# Patient Record
Sex: MALE | Race: WHITE | Employment: OTHER | URBAN - METROPOLITAN AREA
[De-identification: names, ages, dates, MRNs, and addresses within clinical notes are randomized per-mention and may not be internally consistent; named-entity substitution may affect disease eponyms.]

---

## 2017-01-01 ENCOUNTER — APPOINTMENT (OUTPATIENT)
Dept: CT IMAGING | Facility: CLINIC | Age: 67
DRG: 070 | End: 2017-01-01
Attending: EMERGENCY MEDICINE
Payer: MEDICARE

## 2017-01-01 ENCOUNTER — APPOINTMENT (OUTPATIENT)
Dept: MRI IMAGING | Facility: CLINIC | Age: 67
DRG: 070 | End: 2017-01-01
Payer: MEDICARE

## 2017-01-01 ENCOUNTER — APPOINTMENT (OUTPATIENT)
Dept: GENERAL RADIOLOGY | Facility: CLINIC | Age: 67
DRG: 070 | End: 2017-01-01
Attending: EMERGENCY MEDICINE
Payer: MEDICARE

## 2017-01-01 ENCOUNTER — HOSPITAL ENCOUNTER (INPATIENT)
Facility: CLINIC | Age: 67
LOS: 1 days | DRG: 070 | End: 2017-01-09
Attending: EMERGENCY MEDICINE | Admitting: INTERNAL MEDICINE
Payer: MEDICARE

## 2017-01-01 VITALS
WEIGHT: 192.9 LBS | SYSTOLIC BLOOD PRESSURE: 111 MMHG | TEMPERATURE: 98.3 F | RESPIRATION RATE: 25 BRPM | BODY MASS INDEX: 27.01 KG/M2 | DIASTOLIC BLOOD PRESSURE: 51 MMHG | OXYGEN SATURATION: 89 % | HEIGHT: 71 IN

## 2017-01-01 DIAGNOSIS — R41.82 ALTERED MENTAL STATUS, UNSPECIFIED ALTERED MENTAL STATUS TYPE: ICD-10-CM

## 2017-01-01 DIAGNOSIS — D64.9 ANEMIA, UNSPECIFIED TYPE: ICD-10-CM

## 2017-01-01 DIAGNOSIS — Z99.2 ESRD (END STAGE RENAL DISEASE) ON DIALYSIS (H): ICD-10-CM

## 2017-01-01 DIAGNOSIS — Z66 DNR (DO NOT RESUSCITATE): ICD-10-CM

## 2017-01-01 DIAGNOSIS — N18.6 ESRD (END STAGE RENAL DISEASE) ON DIALYSIS (H): ICD-10-CM

## 2017-01-01 DIAGNOSIS — Z78.9 DNI (DO NOT INTUBATE): ICD-10-CM

## 2017-01-01 LAB
ABO + RH BLD: NORMAL
ABO + RH BLD: NORMAL
ALBUMIN SERPL-MCNC: 2.7 G/DL (ref 3.4–5)
ALP SERPL-CCNC: 103 U/L (ref 40–150)
ALT SERPL W P-5'-P-CCNC: 10 U/L (ref 0–70)
AMMONIA PLAS-SCNC: NORMAL UMOL/L (ref 10–50)
ANION GAP SERPL CALCULATED.3IONS-SCNC: 14 MMOL/L (ref 3–14)
APTT PPP: 64 SEC (ref 22–37)
AST SERPL W P-5'-P-CCNC: 8 U/L (ref 0–45)
BASOPHILS # BLD AUTO: 0 10E9/L (ref 0–0.2)
BASOPHILS NFR BLD AUTO: 0.3 %
BILIRUB SERPL-MCNC: 0.5 MG/DL (ref 0.2–1.3)
BLD GP AB SCN SERPL QL: NORMAL
BLOOD BANK CMNT PATIENT-IMP: NORMAL
BUN SERPL-MCNC: 76 MG/DL (ref 7–30)
CA-I BLD-SCNC: 5.1 MG/DL (ref 4.4–5.2)
CALCIUM SERPL-MCNC: 9.4 MG/DL (ref 8.5–10.1)
CHLORIDE SERPL-SCNC: 105 MMOL/L (ref 94–109)
CO2 BLDCOV-SCNC: 16 MMOL/L (ref 21–28)
CO2 BLDCOV-SCNC: 17 MMOL/L (ref 21–28)
CO2 SERPL-SCNC: 19 MMOL/L (ref 20–32)
CREAT SERPL-MCNC: 5 MG/DL (ref 0.66–1.25)
DIFFERENTIAL METHOD BLD: ABNORMAL
EOSINOPHIL # BLD AUTO: 0.1 10E9/L (ref 0–0.7)
EOSINOPHIL NFR BLD AUTO: 0.9 %
ERYTHROCYTE [DISTWIDTH] IN BLOOD BY AUTOMATED COUNT: 17.8 % (ref 10–15)
ERYTHROCYTE [DISTWIDTH] IN BLOOD BY AUTOMATED COUNT: 18 % (ref 10–15)
GFR SERPL CREATININE-BSD FRML MDRD: 12 ML/MIN/1.7M2
GLUCOSE BLD-MCNC: 102 MG/DL (ref 70–99)
GLUCOSE BLDC GLUCOMTR-MCNC: 103 MG/DL (ref 70–99)
GLUCOSE SERPL-MCNC: 107 MG/DL (ref 70–99)
HCT VFR BLD AUTO: 18.3 % (ref 40–53)
HCT VFR BLD AUTO: 18.4 % (ref 40–53)
HCT VFR BLD CALC: 28 %PCV (ref 40–53)
HGB BLD CALC-MCNC: 9.5 G/DL (ref 13.3–17.7)
HGB BLD-MCNC: 5.8 G/DL (ref 13.3–17.7)
HGB BLD-MCNC: 6 G/DL (ref 13.3–17.7)
IMM GRANULOCYTES # BLD: 0 10E9/L (ref 0–0.4)
IMM GRANULOCYTES NFR BLD: 0.3 %
INR BLD: 5.7 (ref 0.86–1.14)
INR PPP: 5.25 (ref 0.86–1.14)
INTERPRETATION ECG - MUSE: NORMAL
LACTATE BLD-SCNC: 1.7 MMOL/L (ref 0.7–2.1)
LYMPHOCYTES # BLD AUTO: 0.7 10E9/L (ref 0.8–5.3)
LYMPHOCYTES NFR BLD AUTO: 9.7 %
MCH RBC QN AUTO: 33.1 PG (ref 26.5–33)
MCH RBC QN AUTO: 33.3 PG (ref 26.5–33)
MCHC RBC AUTO-ENTMCNC: 31.7 G/DL (ref 31.5–36.5)
MCHC RBC AUTO-ENTMCNC: 32.6 G/DL (ref 31.5–36.5)
MCV RBC AUTO: 102 FL (ref 78–100)
MCV RBC AUTO: 105 FL (ref 78–100)
MONOCYTES # BLD AUTO: 0.8 10E9/L (ref 0–1.3)
MONOCYTES NFR BLD AUTO: 11.4 %
MRSA DNA SPEC QL NAA+PROBE: NORMAL
NEUTROPHILS # BLD AUTO: 5.7 10E9/L (ref 1.6–8.3)
NEUTROPHILS NFR BLD AUTO: 77.4 %
NRBC # BLD AUTO: 0 10*3/UL
NRBC BLD AUTO-RTO: 0 /100
PCO2 BLDV: 33 MM HG (ref 40–50)
PCO2 BLDV: 33 MM HG (ref 40–50)
PH BLDV: 7.3 PH (ref 7.32–7.43)
PH BLDV: 7.31 PH (ref 7.32–7.43)
PLATELET # BLD AUTO: 103 10E9/L (ref 150–450)
PLATELET # BLD AUTO: 142 10E9/L (ref 150–450)
PO2 BLDV: 44 MM HG (ref 25–47)
PO2 BLDV: 52 MM HG (ref 25–47)
POTASSIUM BLD-SCNC: 4.4 MMOL/L (ref 3.4–5.3)
POTASSIUM SERPL-SCNC: 4.4 MMOL/L (ref 3.4–5.3)
PROT SERPL-MCNC: 7 G/DL (ref 6.8–8.8)
RBC # BLD AUTO: 1.75 10E12/L (ref 4.4–5.9)
RBC # BLD AUTO: 1.8 10E12/L (ref 4.4–5.9)
RETICS # AUTO: 81 10E9/L (ref 25–95)
RETICS/RBC NFR AUTO: 4.5 % (ref 0.5–2)
SAO2 % BLDV FROM PO2: 76 %
SAO2 % BLDV FROM PO2: 83 %
SODIUM BLD-SCNC: 138 MMOL/L (ref 133–144)
SODIUM SERPL-SCNC: 138 MMOL/L (ref 133–144)
SPECIMEN EXP DATE BLD: NORMAL
SPECIMEN SOURCE: NORMAL
WBC # BLD AUTO: 14.2 10E9/L (ref 4–11)
WBC # BLD AUTO: 7.4 10E9/L (ref 4–11)

## 2017-01-01 PROCEDURE — 40000498 ZZHCL STATISTIC POTASSIUM ED POCT

## 2017-01-01 PROCEDURE — 36415 COLL VENOUS BLD VENIPUNCTURE: CPT | Performed by: EMERGENCY MEDICINE

## 2017-01-01 PROCEDURE — 82330 ASSAY OF CALCIUM: CPT

## 2017-01-01 PROCEDURE — 40000501 ZZHCL STATISTIC HEMATOCRIT ED POCT

## 2017-01-01 PROCEDURE — 87641 MR-STAPH DNA AMP PROBE: CPT | Performed by: INTERNAL MEDICINE

## 2017-01-01 PROCEDURE — 85027 COMPLETE CBC AUTOMATED: CPT | Performed by: INTERNAL MEDICINE

## 2017-01-01 PROCEDURE — 85610 PROTHROMBIN TIME: CPT | Mod: QW

## 2017-01-01 PROCEDURE — 82140 ASSAY OF AMMONIA: CPT | Performed by: EMERGENCY MEDICINE

## 2017-01-01 PROCEDURE — 93005 ELECTROCARDIOGRAM TRACING: CPT

## 2017-01-01 PROCEDURE — 86900 BLOOD TYPING SEROLOGIC ABO: CPT | Performed by: EMERGENCY MEDICINE

## 2017-01-01 PROCEDURE — 86901 BLOOD TYPING SEROLOGIC RH(D): CPT | Performed by: EMERGENCY MEDICINE

## 2017-01-01 PROCEDURE — 70498 CT ANGIOGRAPHY NECK: CPT

## 2017-01-01 PROCEDURE — 25000125 ZZHC RX 250: Performed by: EMERGENCY MEDICINE

## 2017-01-01 PROCEDURE — 25000128 H RX IP 250 OP 636: Performed by: EMERGENCY MEDICINE

## 2017-01-01 PROCEDURE — 85610 PROTHROMBIN TIME: CPT | Performed by: EMERGENCY MEDICINE

## 2017-01-01 PROCEDURE — 74000 XR ABDOMEN PORT F1 VW: CPT

## 2017-01-01 PROCEDURE — 85025 COMPLETE CBC W/AUTO DIFF WBC: CPT | Performed by: EMERGENCY MEDICINE

## 2017-01-01 PROCEDURE — 20000004 ZZH R&B ICU UMMC

## 2017-01-01 PROCEDURE — 85730 THROMBOPLASTIN TIME PARTIAL: CPT | Performed by: EMERGENCY MEDICINE

## 2017-01-01 PROCEDURE — 87640 STAPH A DNA AMP PROBE: CPT | Performed by: INTERNAL MEDICINE

## 2017-01-01 PROCEDURE — 99291 CRITICAL CARE FIRST HOUR: CPT | Mod: 25 | Performed by: EMERGENCY MEDICINE

## 2017-01-01 PROCEDURE — 40000497 ZZHCL STATISTIC SODIUM ED POCT

## 2017-01-01 PROCEDURE — 96374 THER/PROPH/DIAG INJ IV PUSH: CPT

## 2017-01-01 PROCEDURE — 40000556 ZZH STATISTIC PERIPHERAL IV START W US GUIDANCE

## 2017-01-01 PROCEDURE — 25500064 ZZH RX 255 OP 636: Performed by: EMERGENCY MEDICINE

## 2017-01-01 PROCEDURE — 99285 EMERGENCY DEPT VISIT HI MDM: CPT | Mod: 25

## 2017-01-01 PROCEDURE — 40000739 ZZH STATISTIC STROKE CODE W/O ACCESS

## 2017-01-01 PROCEDURE — 87040 BLOOD CULTURE FOR BACTERIA: CPT | Performed by: EMERGENCY MEDICINE

## 2017-01-01 PROCEDURE — 80053 COMPREHEN METABOLIC PANEL: CPT | Performed by: EMERGENCY MEDICINE

## 2017-01-01 PROCEDURE — 83605 ASSAY OF LACTIC ACID: CPT

## 2017-01-01 PROCEDURE — 86850 RBC ANTIBODY SCREEN: CPT | Performed by: EMERGENCY MEDICINE

## 2017-01-01 PROCEDURE — 70551 MRI BRAIN STEM W/O DYE: CPT

## 2017-01-01 PROCEDURE — 40000502 ZZHCL STATISTIC GLUCOSE ED POCT

## 2017-01-01 PROCEDURE — 93010 ELECTROCARDIOGRAM REPORT: CPT | Mod: Z6 | Performed by: EMERGENCY MEDICINE

## 2017-01-01 PROCEDURE — 85045 AUTOMATED RETICULOCYTE COUNT: CPT | Performed by: EMERGENCY MEDICINE

## 2017-01-01 PROCEDURE — 84145 PROCALCITONIN (PCT): CPT | Performed by: INTERNAL MEDICINE

## 2017-01-01 PROCEDURE — 25000125 ZZHC RX 250: Performed by: INTERNAL MEDICINE

## 2017-01-01 PROCEDURE — 71010 XR CHEST PORT 1 VW: CPT

## 2017-01-01 PROCEDURE — 00000146 ZZHCL STATISTIC GLUCOSE BY METER IP

## 2017-01-01 PROCEDURE — 82803 BLOOD GASES ANY COMBINATION: CPT

## 2017-01-01 RX ORDER — PIPERACILLIN SODIUM, TAZOBACTAM SODIUM 2; .25 G/10ML; G/10ML
2.25 INJECTION, POWDER, LYOPHILIZED, FOR SOLUTION INTRAVENOUS EVERY 6 HOURS
Status: DISCONTINUED | OUTPATIENT
Start: 2017-01-01 | End: 2017-01-01 | Stop reason: HOSPADM

## 2017-01-01 RX ORDER — ALBUTEROL SULFATE 0.83 MG/ML
2.5 SOLUTION RESPIRATORY (INHALATION)
Status: DISCONTINUED | OUTPATIENT
Start: 2017-01-01 | End: 2017-01-01 | Stop reason: CLARIF

## 2017-01-01 RX ORDER — IPRATROPIUM BROMIDE AND ALBUTEROL SULFATE 2.5; .5 MG/3ML; MG/3ML
3 SOLUTION RESPIRATORY (INHALATION) EVERY 6 HOURS
Status: DISCONTINUED | OUTPATIENT
Start: 2017-01-01 | End: 2017-01-01 | Stop reason: HOSPADM

## 2017-01-01 RX ORDER — SODIUM CHLORIDE 9 MG/ML
INJECTION, SOLUTION INTRAVENOUS CONTINUOUS
Status: DISCONTINUED | OUTPATIENT
Start: 2017-01-01 | End: 2017-01-01

## 2017-01-01 RX ORDER — IOPAMIDOL 755 MG/ML
75 INJECTION, SOLUTION INTRAVASCULAR ONCE
Status: COMPLETED | OUTPATIENT
Start: 2017-01-01 | End: 2017-01-01

## 2017-01-01 RX ORDER — NALOXONE HYDROCHLORIDE 0.4 MG/ML
.1-.4 INJECTION, SOLUTION INTRAMUSCULAR; INTRAVENOUS; SUBCUTANEOUS
Status: DISCONTINUED | OUTPATIENT
Start: 2017-01-01 | End: 2017-01-01 | Stop reason: HOSPADM

## 2017-01-01 RX ORDER — MORPHINE SULFATE 2 MG/ML
INJECTION, SOLUTION INTRAMUSCULAR; INTRAVENOUS
Status: DISCONTINUED
Start: 2017-01-01 | End: 2017-01-01 | Stop reason: WASHOUT

## 2017-01-01 RX ADMIN — SODIUM CHLORIDE: 9 INJECTION, SOLUTION INTRAVENOUS at 10:12

## 2017-01-01 RX ADMIN — PANTOPRAZOLE SODIUM 40 MG: 40 INJECTION, POWDER, FOR SOLUTION INTRAVENOUS at 12:32

## 2017-01-01 RX ADMIN — PIPERACILLIN SODIUM,TAZOBACTAM SODIUM 2.25 G: 2; .25 INJECTION, POWDER, FOR SOLUTION INTRAVENOUS at 15:47

## 2017-01-01 RX ADMIN — IOPAMIDOL 75 ML: 755 INJECTION, SOLUTION INTRAVENOUS at 09:08

## 2017-01-01 RX ADMIN — SODIUM CHLORIDE, PRESERVATIVE FREE 90 ML: 5 INJECTION INTRAVENOUS at 09:08

## 2017-01-09 PROBLEM — R41.82 ALTERED MENTAL STATUS: Status: ACTIVE | Noted: 2017-01-01

## 2017-01-09 NOTE — PHARMACY
Pharmacy Stroke Code Response  Pharmacist responded as part of the Stroke Code Team activation to patient care area ED.  The Stroke Team determined that the patient was not a candidate for IV alteplase therapy and the pharmacy team was dismissed at 0840.

## 2017-01-09 NOTE — SIGNIFICANT EVENT
DEATH PRONOUNCEMENT     Physical Exam: Patient was unresponsive to noxious stimuli, no spontaneous respirations. Breath and heart sounds absent upon auscultation, no pupillary light reflex.     Patient was pronounced dead at:  5:15 pm   Autopsy was discussed with the family and family consented.     Vy An PGY-3  Internal Medicine

## 2017-01-09 NOTE — H&P
"INTERNAL MEDICINE HISTORY & PHYSICAL  MEDICAL ICU  Date of service: 2017      Patient: Laurent Chen      : 1950      MRN: [id number]    PCP: Maximiliano Jones Md          Chief complaint:   Right eye deviation           History of Present Illness:   Laurent Chen is a 66 year old with PMH of COPD, ISAURA, HTN, CAD s/p PCI, CVA, afib (on coumadin),  TAVR (2014),  ESRD secondary to FSGS with failed LDKT on in center dialysis, admitted from dialysis with AMS and concern for stroke.  Per collateral history patient had been at his LTACH and has been \"normal\" neurologically except he had some cough and \"congestion\". He then went for dialysis today where dialysis staff noted he had \"eye deviation\" and altered mental status. They sent him to [facility] ED. In [facility] ED stroke code called. Stroke Neuro team does not think this is CVA but do think he should have neurology consult for possible seizure rule out.   History is all obtained from patient LTACH. Attempted to contact patient wife several times but cell # listed the person answered that they were not named [place]. The work number was not functioning. [facility]          Review of Symptoms:     Patient minimally responsive. Attempted to contact wife multiple times on demographics section regarding patient. [facility].   They said at baseline he is \"cognitively aware\". They said he can answer \"any questions\". He came down with \"cough, congestion\", no fevers or chills. Today they got him up for dialysis, he had difficult time as he was coughing and had bowel movement with coughing.   They also stated patient did not have any hematochezia, hematuria, hemoptysis, melena.   Current POLST 11/15/16 DNR/DNI per [facility]           Past Medical History:     Past Medical History   Diagnosis Date     Aortic stenosis      SOB (shortness of breath)      COPD (chronic obstructive pulmonary disease) (H)      Pneumonia " 1/31/14-2/8/14     Volume overload      Dialysis patient (H)      Sleep apnea      HTN (hypertension)      ASCVD (arteriosclerotic cardiovascular disease)      Anemia      Thrombocytopenia (H)      A-V fistula (H)      Tobacco use      Atrial fibrillation (H)      Gout      CAD (coronary artery disease)      CVA (cerebral vascular accident) (H)      no residual     Stented coronary artery      Antiplatelet or antithrombotic long-term use      Walking troubles      Difficulty in walking(719.7)      Oxygen dependent      4 liters     Heart murmur      Renal failure      Dialysis M-W-F     Gastro-oesophageal reflux disease        Past Surgical History   Procedure Laterality Date     Av fistula or graft arterial       Transplant kidney recipient living related       kidney from daughter     Cataract iol, rt/lt       Cardiac surgery       plasty with stent     Heart cath femoral cannulization with open standby repair aortic valve  7/23/2014     Procedure: HEART CATH FEMORAL CANNULIZATION WITH OPEN STANDBY REPAIR AORTIC VALVE;  Surgeon: Chu Rao MD;  Location:  OR              Allergies:     No Known Allergies            Medications:       No current facility-administered medications on file prior to encounter.  Current Outpatient Prescriptions on File Prior to Encounter:  omeprazole (PRILOSEC OTC) 20 MG tablet Take 1 tablet (20 mg) by mouth daily   ticagrelor (BRILINTA) 90 MG tablet Take 1 tablet (90 mg) by mouth every 12 hours   calcium acetate (PHOSLO) 667 MG CAPS Take 667 mg by mouth 3 times daily (with meals) And with snack   atorvastatin (LIPITOR) 40 MG tablet Take 1 tablet (40 mg) by mouth daily   ALLOPURINOL PO Take 100 mg by mouth daily   nitroglycerin (NITROSTAT) 0.4 MG SL tablet Place 1 tablet (0.4 mg) under the tongue every 5 minutes as needed for chest pain   triamcinolone (KENALOG) 0.1 % cream Apply topically as needed   ammonium lactate (AMLACTIN) 12 % cream Apply topically as needed   WARFARIN  SODIUM PO Take 5-7.5 mg by mouth daily . Take 5mg once daily every Thursday, Saturday, and Sunday. Take 7.5mg once daily every Monday, Tuesday, Wednesday, and Friday.   ACETAMINOPHEN PO Take 500 mg by mouth at onset of headache As needed   albuterol (PROAIR HFA, PROVENTIL HFA, VENTOLIN HFA) 108 (90 BASE) MCG/ACT inhaler Inhale 2 puffs into the lungs every 4 hours as needed   VITAMIN D, CHOLECALCIFEROL, PO Take 1,000 Units by mouth daily   ipratropium - albuterol 0.5 mg/2.5 mg/3 mL (DUONEB) 0.5-2.5 (3) MG/3ML nebulization Take 1 vial by nebulization every 4 hours as needed   metoprolol (LOPRESSOR) 100 MG tablet Take 100 mg by mouth 2 times daily Take 100 mg bid on non-dialysis days. 100 mg once a day on dialysis days.   furosemide (LASIX) 80 MG tablet Take 80 mg by mouth 2 times daily   Calcium-Vitamin D (CALTRATE 600 PLUS-VIT D OR) Take by mouth 2 times daily            Social &  Family History:   Lives in LTACH          Physical Exam:   /82 mmHg  Resp 34  SpO2 99%    Resp: 34    General:  alert,   HEENT: NC/AT, dry mouth   CV: irregularly regular, no m/r/g  Lungs: tachypnic but no wheezes  Abd: Soft, ND, NT, active BS,  Ext: pigmented extremities   Skin: No rashes, cyanosis, or jaundice  Neuro: alerted but not oriented or            Data:   Labs: All labs reviewed in EMR. Pertinent include:  Cr 5   Lactate 1.7  7.3/33/52/16 (VBG)  CBC  hgb 6.0 (baseline 8-9)   Plt 103     INR 5.25     EKG:     Bld cx x 2     Imaging (all imaging studies reviewed by me):  # CXR   Aortic valve repair device in place. The cardiomediastinal silhouette  is enlarged, stable. Atherosclerotic calcifications of the aortic  arch. Trachea is midline. The pulmonary vasculature is indistinct.  Bilateral pleural effusions, left greater than right. No appreciable  pneumothorax. Diffuse bilateral mixed interstitial and airspace  opacities, greatest in the lung bases. No acute osseous  abnormality.                                                                       Impression:  Mixed interstitial and airspace opacities, greatest in  the lung bases, with associated pleural effusions with overlying  atelectasis/consolidation and pulmonary congestion is most consistent  with pulmonary edema; however, infection cannot be excluded.     I have personally reviewed the examination and initial interpretation  and I agree with the findings      #MRI:   Impression:  1. No acute infarct.  2. Mild chronic small vessel ischemic disease and generalized  parenchymal volume loss.     I have personally reviewed the examination and initial interpretation  and I agree with the findings.    Procedures: Care Everywhere  12/2016  The examined esophagus was normal.         The Z-line was regular and was found 47 cm from the incisors.         Patchy mildly erythematous and edematous mucosa was found in the gastric          antrum. Biopsies were taken with a cold forceps for histology. Mild          mucosal oozing noted from one spot in antrum. No ulcer or erosion noted          at this site. Area was successfully injected with of a 1:10,000 solution          of epinephrine for hemostasis. For hemostasis, one hemostatic clip was          successfully placed. There was no bleeding at the end of the procedure.         The exam of the stomach was otherwise normal including retroflexed views          of the cardia and fundus.         The duodenal bulb and 2nd part of the duodenum were normal.    Impression:         - Normal esophagus.         - Z-line regular, 47 cm from the incisors.         - Erythematous edematous mucosa in the antrum with mild oozing from one          spot. Injected. Clip was placed.         - Normal duodenal bulb and 2nd part of the duodenum.  Hgb 6.9 at that time   Colonoscopy:   3/2016   Impression: - Internal hemorrhoids.  - The entire examined colon is normal.  - The examined portion of the ileum  was normal.  - One 5 mm polyp in the rectum. Biopsied.  - Biopsies performed in the terminal ileum, each in the   cecum/ascending/hepatic flexure, each in the   proximal/distal transverse colon/splenic flexure and   each in the descending/sigmoid/rectum.            Assessment/Plan:   Laurent Chen is a 66 year old with PMH of COPD, ISAURA, HTN, CAD s/p PCI, CVA, afib (on coumadin),  TAVR (7/2014),  ESRD secondary to FSGS with failed LDKT on in center dialysis, admitted from dialysis with AMS and concern for stroke.    NEURO:  # Analgesia/Sedation: None    # Encephalopathy: CVA ruled out per stroke team. Etiology is unclear. Has abnormal neurological findings on exam currently but seems to be improving from sign out given by ED. Ddx is broad but not limited to toxic, metabolic, etc.   --General Neurology consult  --NPO    RESP:  # Acute hypoxic respiratory failure: Some evidence of infiltrate on CXR and has increased BUN/cr and possibly new oxygen requirements? 2/2 infection vs volume overload.    --Sputum culture   --Blood culture x 2   --Zosyn, vanc and levofloxacin   --Procalcitonin   --Supplemental oxygen as needed     # COPD: duoneb, albuterol     CV:   # AS s/p TAVR    # CAD s/p PCI in 2014   Ticagrelor, ASA and Warfarin  Hold warfarin for now  Continue Brilinta   Atorvastatin      # A fib on warfarin   Metoprolol,  Hold for now with concern for sepsis and hypotension.     ID:   Cultures: Blood cultures pending   Per history, imaging possibly pulmonary source  Pro calcitonin pending   Zosyn, vanc and levofloxacin         RENAL:  ESRD: Renal consulted     # Fluids: hypervolemic, Renal consulted for dialysis     # Electrolytes: Stable     GI:   # H/o GIB (unclear source): Unlcear history as the [facility] states patient has no blood discharge (hematoheczia, hematuria, hemopytysis) however hgb is 6.0. Extensive workup in past year per OSH records. Hgb has been 6.9 as most recently as December.    --NPO  --PPI  --Consider GI consult after speaking with wife and do rectal exam.    ENDO:  No issues   Hypoglycemia protocol when NPO    HEME/ONC:  # Anemia: likely 2/2 GIB v microcytic anemia from longstanding blood loss and CKD    --Transfuse when consent able to be obtained from wife. Currently patient is alert but not oriented and HD stable. Thus no emergent indication for transfusion.       Ppx:  # Ulcer: PPI  # VTE: None, given anemia and INR elevated      Lines/Devices:  PIV    Code: DNR/DNI per most recent POLST.     Dispo: MICU for HD and possible CAP and ams of unclear etiology     [place] (wife) [phone] and [phone]  [facility] [phone]    Plan of care was discussed w/ Dr. Vee M.D. Please, see attending addendum for changes & additions to the plan.    Vy An   Internal Medicine

## 2017-01-09 NOTE — CONSULTS
Brief Neurology Consult Note    Neurology consulted for encephalopathy of unclear etiology. On arrival to the patients room the patient is agonal breathing, eyes open with eyes rolled into back of head. No significant gaze deviation. Pupils equal and reactive. Barely has corneal reflexes. MICU team present, who contacted patients wife and confirmed DNR/DNI status. Ordered stat Keppra load with plan to go repeat CT/CT-A. Despite attempt to medically stabilize, patient passed.    D/w staff, Dr. Evan Schmidt, DO  Neurology PGY3

## 2017-01-09 NOTE — CONSULTS
[facility], [place]      Neurology Stroke Consult    Patient Name: Laurent Chen  : 1950 MRN#: [id number]    STROKE DATA     Stroke Code:  Time called:  2017 8:17  Time patient seen:  2017 8:20  Onset of symptoms:  2017 around 7:30  Last known normal (pt's baseline):  2017 6:00   Head CT read by PASTORA JESSICA MD at:  2017 9:11    TPA treatment:  Not given due to current / recent anticoagulant use with INR > 1.7, GI bleed in past 21 days.     National Institutes of Health Stroke Scale (at presentation)  NIHSS done at:  time patient seen      Score   Level of consciousness: (2)   Not alert; repeat stim to attend strong stim/pain to move   LOC questions: (2)   Answers neither question correctly   LOC commands: (2)   Performs neither task correctly   Best gaze: (2)   Forced deviation   Visual: (2)   Complete hemianopia   Facial palsy: (0)   Normal symmetrical movements   Motor arm (left): (2)   Some effort against gravity   Motor arm (right): (2)   Some effort against gravity   Motor leg (left): (3)   No effort against gravity   Motor leg (right): (3)   No effort against gravity   Limb ataxia: (0)   Absent   Sensory: (0)   Normal- no sensory loss   Best language: (2)   Moderate to severe aphasia   Dysarthria: (2)   Moderate to severe dysarthria   Extinction and inattention: (0)   No abnormality       NIHSS Total Score: 24        Dysphagia Screen  Did not perform screening due to dysarthria & encephalopathy.     ASSESSMENT & PLAN BY PROBLEM     #Suspect non-convulsive seizure vs toxic/metabolic encephalopathy. With encephalopathy & right eye deviation on exam. No acute stroke, bleed or vessel occlusion seen on imaging. No evidence of PRES on imaging. Possibly had non-covulsive seizure +/- post-ictal encephalopathy. Can also consider metabolic process or infection.  -no evidence of stroke  -consider general neurology consult & vEEG  monitoring  -further w/u of toxic/metabolic process per primary team    Note: we accessed CareEverywhere during stroke code to obtain additional information about patient's medical history that was crucial to providing appropriate care. Could not obtain consent from patient due to his condition.    Stroke Neurology will sign off. Thank you for the consult. Please contact us w/ further questions.    ____________________________    HPI  Laurent Chen is a 66 year old male with hx HTN, ESRD, prior stroke (w/ no residual deficits per notes), CAD, afib on coumadin & recent GI bleed who presents with encephalopathy & decreased responsiveness. Onset today 1/9/16 at dialysis center, onset of symptoms around 7:30AM. Last known normal (per RN at patient's living facility) was 6:00AM. At baseline, patient uses wheelchair for ambulation. He is usually able to feed himself & can usually carry out a full conversation w/out difficulty. Patient has afib & is on warfarin secondary stroke prevention. He was recently hospitalized for GI bleed 12/14/16, had 'oozing antrum,' which was clipped; required blood transfusion at that time. Presented to OSH ED with repeat bleed 12/28/16; warfarin was held for 2 days, then resumed.    Pertinent Past Medical/Surgical History  Past Medical History   Diagnosis Date     Aortic stenosis      SOB (shortness of breath)      COPD (chronic obstructive pulmonary disease) (H)      Pneumonia 1/31/14-2/8/14     Volume overload      Dialysis patient (H)      Sleep apnea      HTN (hypertension)      ASCVD (arteriosclerotic cardiovascular disease)      Anemia      Thrombocytopenia (H)      A-V fistula (H)      Tobacco use      Atrial fibrillation (H)      Gout      CAD (coronary artery disease)      CVA (cerebral vascular accident) (H)      no residual     Stented coronary artery      Antiplatelet or antithrombotic long-term use      Walking troubles      Difficulty in walking(719.7)      Oxygen  dependent      4 liters     Heart murmur      Renal failure      Dialysis M-W-F     Gastro-oesophageal reflux disease        Past Surgical History   Procedure Laterality Date     Av fistula or graft arterial       Transplant kidney recipient living related       kidney from daughter     Cataract iol, rt/lt       Cardiac surgery       plasty with stent     Heart cath femoral cannulization with open standby repair aortic valve  7/23/2014     Procedure: HEART CATH FEMORAL CANNULIZATION WITH OPEN STANDBY REPAIR AORTIC VALVE;  Surgeon: Chu Rao MD;  Location: [facility] OR       Medications: I have reviewed this patient's current medications.    Allergies: All allergies reviewed and addressed.    Family History: I have reviewed this patient's family history.    Social History: I have reviewed this patient's social history.    Tobacco use: unable to assess due to patient's condition    ROS:  The 10 point Review of Systems is negative other than noted in the HPI.    PHYSICAL EXAMINATION  /56 mmHg  Temp(Src) 97.9  F (36.6  C) (Oral)  Resp 46  SpO2 100%    General:  patient lying in bed without any acute distress    HEENT:  normocephalic/atraumatic  Cardio: irregular rhythm  Pulmonary:  no respiratory distress  Abdomen:  soft  Extremities:  no edema  Skin:  warm/dry , venous stasis changes in legs/shins    Neurologic  Mental Status:  Eyes open but not tracking to face, not following commands, not answering questions appropriately, non-fluent speech only intermittent groans.  Cranial Nerves: Eyes deviated to right & not crossing midline w/ oculocephalic maneuver, PERRL, moderate-severe dysarthria, no facial asymmetry  Motor/Sensory: moves both arms against gravity spontaneously, no obvious asymmetry. Minimal withdrawal in legs w/ noxious stimuli but no effort against gravity.   Reflexes: Neutral toes.  Coord/Gait: could not assess due to patient's condition.    Labs  Labs and Imaging reviewed and used in developing the  plan; pertinent results included.     Lab Results   Component Value Date    * 07/29/2014       The patient was discussed with the fellow, Dr. Curiel.  The staff is Dr. Perales.    Sahrla Kohler   Neurology

## 2017-01-09 NOTE — ED PROVIDER NOTES
History     Chief Complaint   Patient presents with     Altered Mental Status     HPI  Laurent Chen is a 66 year old male dialysis patient who was transferred to our facility from [facility] this morning by amblance after they attempted to start dialysis but found the patient to become unresponsive.  Because of his altered mental status, dialysis attempts were discontinued and the patient was transferred to our facility.   The patient apparently has normal mentation as a baseline and his last known normal baseline mental status  Was presumably at the dialysis unit this morning.  I was asked to see the patient immediately.  The patient was unable provide any history.  The patient was found with eyes deviated to the right and up with no purposeful movement.  The patient was moved to cubicle #1 and a stroke team was called.    I have reviewed the Medications, Allergies, Past Medical and Surgical History, and Social History in the [employer] system.  Past Medical History   Diagnosis Date     Aortic stenosis      SOB (shortness of breath)      COPD (chronic obstructive pulmonary disease) (H)      Pneumonia 1/31/14-2/8/14     Volume overload      Dialysis patient (H)      Sleep apnea      HTN (hypertension)      ASCVD (arteriosclerotic cardiovascular disease)      Anemia      Thrombocytopenia (H)      A-V fistula (H)      Tobacco use      Atrial fibrillation (H)      Gout      CAD (coronary artery disease)      CVA (cerebral vascular accident) (H)      no residual     Stented coronary artery      Antiplatelet or antithrombotic long-term use      Walking troubles      Difficulty in walking(719.7)      Oxygen dependent      4 liters     Heart murmur      Renal failure      Dialysis M-W-F     Gastro-oesophageal reflux disease      Past Surgical History   Procedure Laterality Date     Av fistula or graft arterial       Transplant kidney recipient living related       kidney from daughter     Cataract iol, rt/lt        Cardiac surgery       plasty with stent     Heart cath femoral cannulization with open standby repair aortic valve  7/23/2014     Procedure: HEART CATH FEMORAL CANNULIZATION WITH OPEN STANDBY REPAIR AORTIC VALVE;  Surgeon: Chu Rao MD;  Location: [facility] OR     Current Outpatient Prescriptions   Medication Sig Dispense Refill     omeprazole (PRILOSEC OTC) 20 MG tablet Take 1 tablet (20 mg) by mouth daily 90 tablet 1     ticagrelor (BRILINTA) 90 MG tablet Take 1 tablet (90 mg) by mouth every 12 hours 180 tablet 3     calcium acetate (PHOSLO) 667 MG CAPS Take 667 mg by mouth 3 times daily (with meals) And with snack       atorvastatin (LIPITOR) 40 MG tablet Take 1 tablet (40 mg) by mouth daily 90 tablet 3     ALLOPURINOL PO Take 100 mg by mouth daily       nitroglycerin (NITROSTAT) 0.4 MG SL tablet Place 1 tablet (0.4 mg) under the tongue every 5 minutes as needed for chest pain 25 tablet 1     triamcinolone (KENALOG) 0.1 % cream Apply topically as needed       ammonium lactate (AMLACTIN) 12 % cream Apply topically as needed       WARFARIN SODIUM PO Take 5-7.5 mg by mouth daily . Take 5mg once daily every Thursday, Saturday, and Sunday. Take 7.5mg once daily every Monday, Tuesday, Wednesday, and Friday.       ACETAMINOPHEN PO Take 500 mg by mouth at onset of headache As needed       albuterol (PROAIR HFA, PROVENTIL HFA, VENTOLIN HFA) 108 (90 BASE) MCG/ACT inhaler Inhale 2 puffs into the lungs every 4 hours as needed       VITAMIN D, CHOLECALCIFEROL, PO Take 1,000 Units by mouth daily       ipratropium - albuterol 0.5 mg/2.5 mg/3 mL (DUONEB) 0.5-2.5 (3) MG/3ML nebulization Take 1 vial by nebulization every 4 hours as needed       metoprolol (LOPRESSOR) 100 MG tablet Take 100 mg by mouth 2 times daily Take 100 mg bid on non-dialysis days. 100 mg once a day on dialysis days.       furosemide (LASIX) 80 MG tablet Take 80 mg by mouth 2 times daily       Calcium-Vitamin D (CALTRATE 600 PLUS-VIT D OR) Take by mouth 2 times  daily        Social History     Social History     Marital Status:      Spouse Name: N/A     Number of Children: N/A     Years of Education: N/A     Occupational History     Not on file.     Social History Main Topics     Smoking status: Former Smoker     Types: Cigarettes     Quit date: 01/20/2014     Smokeless tobacco: Not on file     Alcohol Use: No     Drug Use: No     Sexual Activity: Not on file     Other Topics Concern     Not on file     Social History Narrative      No Known Allergies      Review of Systems    unable to obtain    Physical Exam   BP:  (unable to obtain pressure before CT. Vascular attemptiong IV fistula in left )  Heart Rate: 109  SpO2: 100 %  Physical Exam   Constitutional:     Patient basically unresponsive with no purposeful movement but maintaining his airway and O2 saturation.   HENT:   Head: Atraumatic.   Eyes:    Patient's pupils were symmetric but his eyes were deviated up into the right   Neck: Neck supple.   Cardiovascular:    Heart tones were distant   Pulmonary/Chest:   Patient does have rales throughout with gurgling respirations and fair aeration   Abdominal: Soft.   Musculoskeletal: He exhibits edema.    No gross deformities   Neurological:   Unresponsive with eyes deviated up into the right with no purposeful movement   Skin: Skin is warm.   Psychiatric:   Unable to assess       ED Course   Procedures            patient was moved to cubicle #1 as a stroke team stat was called.     Patient was placed on  Cardiac monitors and oximetry. IV established for blood draw.     Patient was taken immediately to CT under the care of the stroke team where his noncontrast study revealed no evidence of bleed which was initially suspected.    Patient was returned to the ER for the rest of his workup.     EKG revealed atrial fibrillation an average ate of 94 and no acute ST or T-wave changes significant for ischemia. This was reviewed by me personally.     Portable chest x-ray revealed  fluid overload with pulmonary edema     Patient continued to maintai his airway and his O2 sat. Vital signs remained stable as well.  Attempts to call the patient's wife were unsuccessful initially and finally information was obtined from the care center where the patient lives that the patient is DNR/DNI.  Information was faxed to our facility regarding this and the rest the medications he is on.    Results for orders placed or performed during the hospital encounter of 01/09/17   CT Head Neck Angio w/o & w Contrast    Narrative    CTA ANGIOGRAM HEAD NECK 1/9/2017 9:11 AM    Reconstruction by the Radiologist on the 3D workstation    History:  Evaluate for dissection/thromboembolism    Comparison: None available.    Technique:  HEAD CT:  Using multidetector thin collimation helical acquisition  technique, axial, coronal and sagittal CT images from the skull base  to the vertex were obtained without intravenous contrast.   HEAD and NECK CTA: During rapid bolus intravenous injection of  nonionic contrast material, axial images were obtained using thin  collimation multidetector helical technique from the base of the skull  through the [place]. This CT angiogram data was reconstructed  at thin intervals with mild overlap. Images were sent to the [employer]  workstation, and 3D reconstructions were obtained. The axial source  images, multiplanar reformations, 3D reconstructions in both maximum  intensity projection display and volume rendered models were reviewed,  with reconstructions performed by the technologist and the  radiologist.    Contrast: iopamidol (ISOVUE-370) solution 75 mL     Findings:  Head CT: No intracranial hemorrhage, mass effect, or midline shift.  Gray/white matter differentiation in both cerebral hemispheres is  preserved. There is mild patchy low attenuation within the  periventricular and supraventricular white matter of both cerebral  hemispheres, nonspecific but most likely representing  chronic small  vessel ischemic disease given the patient's age. Ventricles are  proportionate to the cerebral sulci. Scattered paranasal sinus mucosal  thickening and frothy debris. Calvarium is intact. Hypopneumatized  mastoid air cells.    Head CTA: Widely patent major intracranial arteries. No stenosis or  aneurysm. Normal variant duplicated right MCA. The anterior and left  posterior communicating arteries are patent. The right posterior  communicating artery is not well-demonstrated.    Neck CTA:  Atherosclerotic narrowing of the proximal right internal carotid  artery with residual lumen measuring 2.3 mm and normal distal lumen  measuring .2 mm, corresponding to approximately 50-60% stenosis.    Atherosclerotic narrowing of the proximal left internal carotid artery  with residual lumen measuring 2.6 mm and normal distal lumen measuring  4.5 mm, corresponding to approximately 40-50% stenosis.    Widely patent codominant vertebral arteries.    Advanced multilevel cervical spondylosis and grade 1 anterolisthesis  of C4 on C5.    Partially visualized scattered bilateral solid and semisolid  subcentimeter pulmonary nodules. Bilateral layering pleural effusions,  right greater than left. Partially visualized mediastinal  lymphadenopathy. For example, 1.4 cm right paratracheal lymph node.      Impression    Impression:    1. Head CT: No acute intracranial pathology.  2. Head CTA: Widely patent major intracranial arteries. No stenosis or  aneurysm.  3. Neck CTA: Approximately 50-60 % stenosis of the proximal right  internal carotid artery and 40-50% stenosis of the left internal  carotid artery. Widely patent posterior circulation.  4. Partially visualized scattered bilateral subcentimeter solid and  semisolid pulmonary nodules, bilateral pleural effusions and  mediastinal lymphadenopathy. Consider dedicated chest CT.    I have personally reviewed the examination and initial interpretation  and I agree with the  findings.    PASTORA JESSICA MD   XR Chest Port 1 View    Narrative    Exam:  XR CHEST PORT 1 VW, 1/9/2017 9:44 AM    History: AMS    Comparison:  Chest radiograph 7/24/2014    Findings:  Single AP view of the chest was obtained.     Aortic valve repair device in place. The cardiomediastinal silhouette  is enlarged, stable. Atherosclerotic calcifications of the aortic  arch. Trachea is midline. The pulmonary vasculature is indistinct.  Bilateral pleural effusions, left greater than right. No appreciable  pneumothorax. Diffuse bilateral mixed interstitial and airspace  opacities, greatest in the lung bases. No acute osseous abnormality.      Impression    Impression:  Mixed interstitial and airspace opacities, greatest in  the lung bases, with associated pleural effusions with overlying  atelectasis/consolidation and pulmonary congestion is most consistent  with pulmonary edema; however, infection cannot be excluded.    I have personally reviewed the examination and initial interpretation  and I agree with the findings.    KEERTHI MCDANIEL MD   MR Brain for Stroke Limited    Narrative    MRI brain without contrast    History: Rightward deviation, unresponsive     Comparison:  CT head 1/9/2017.      Technique:   Brain MRI:  Axial diffusion, FLAIR, T2-weighted, susceptibility images  were obtained without intravenous contrast.     Findings:   No acute infarct on diffusion-weighted images. On the FLAIR images,  there is nonspecific mild periventricular T2-hyperintensity, which are  most likely related to chronic small vessel ischemic disease. Tiny  chronic posterior left temporal cortical infarct with associated  hemosiderin deposition. No acute intracranial hemorrhage. No midline  shift or abnormal extra axial fluid collection. Ventricles are not  enlarged out of proportion to the cerebral sulci. Patent major  intracranial vascular flow voids.    Scattered paranasal sinus mucosal thickening. Mastoid air cells  are  clear.      Impression    Impression:  1. No acute infarct.  2. Mild chronic small vessel ischemic disease and generalized  parenchymal volume loss.    I have personally reviewed the examination and initial interpretation  and I agree with the findings.    PASTORA JESSICA MD   XR Abdomen Port 1 View    Narrative    XR ABDOMEN PORT F1 VW  1/9/2017 10:05 AM      HISTORY: requested by MRI    COMPARISON: None available. A CT dated 2/20/2014 and chest radiograph  on the same date were available for correlation.    FINDINGS: Frontal supine views of the abdomen. Dense calcific  atherosclerosis of the abdominal aorta and its branching vessels.  Surgical clips in the low pelvis. Nonobstructive bowel gas pattern.  Small bilateral pleural effusions with overlying opacities.      Impression    IMPRESSION: No acute findings in the abdomen. Surgical clips over the  left hemipelvis.     I have personally reviewed the examination and initial interpretation  and I agree with the findings.    ANAT GARCIA MD   CBC with platelets differential   Result Value Ref Range    WBC 7.4 4.0 - 11.0 10e9/L    RBC Count 1.80 (L) 4.4 - 5.9 10e12/L    Hemoglobin 6.0 (LL) 13.3 - 17.7 g/dL    Hematocrit 18.4 (L) 40.0 - 53.0 %     (H) 78 - 100 fl    MCH 33.3 (H) 26.5 - 33.0 pg    MCHC 32.6 31.5 - 36.5 g/dL    RDW 17.8 (H) 10.0 - 15.0 %    Platelet Count 103 (L) 150 - 450 10e9/L    Diff Method Automated Method     % Neutrophils 77.4 %    % Lymphocytes 9.7 %    % Monocytes 11.4 %    % Eosinophils 0.9 %    % Basophils 0.3 %    % Immature Granulocytes 0.3 %    Nucleated RBCs 0 0 /100    Absolute Neutrophil 5.7 1.6 - 8.3 10e9/L    Absolute Lymphocytes 0.7 (L) 0.8 - 5.3 10e9/L    Absolute Monocytes 0.8 0.0 - 1.3 10e9/L    Absolute Eosinophils 0.1 0.0 - 0.7 10e9/L    Absolute Basophils 0.0 0.0 - 0.2 10e9/L    Abs Immature Granulocytes 0.0 0 - 0.4 10e9/L    Absolute Nucleated RBC 0.0    Partial thromboplastin time   Result Value Ref Range     PTT 64 (H) 22 - 37 sec   INR   Result Value Ref Range    INR 5.25 (HH) 0.86 - 1.14   Comprehensive metabolic panel   Result Value Ref Range    Sodium 138 133 - 144 mmol/L    Potassium 4.4 3.4 - 5.3 mmol/L    Chloride 105 94 - 109 mmol/L    Carbon Dioxide 19 (L) 20 - 32 mmol/L    Anion Gap 14 3 - 14 mmol/L    Glucose 107 (H) 70 - 99 mg/dL    Urea Nitrogen 76 (H) 7 - 30 mg/dL    Creatinine 5.00 (H) 0.66 - 1.25 mg/dL    GFR Estimate 12 (L) >60 mL/min/1.7m2    GFR Estimate If Black 14 (L) >60 mL/min/1.7m2    Calcium 9.4 8.5 - 10.1 mg/dL    Bilirubin Total 0.5 0.2 - 1.3 mg/dL    Albumin 2.7 (L) 3.4 - 5.0 g/dL    Protein Total 7.0 6.8 - 8.8 g/dL    Alkaline Phosphatase 103 40 - 150 U/L    ALT 10 0 - 70 U/L    AST 8 0 - 45 U/L   INR point of care   Result Value Ref Range    INR Point of Care 5.7 (HH) 0.86 - 1.14   Glucose by meter   Result Value Ref Range    Glucose 103 (H) 70 - 99 mg/dL   EKG 12-lead, tracing only   Result Value Ref Range    Interpretation ECG Click View Image link to view waveform and result    ISTAT gases elec ica gluc cristino POCT   Result Value Ref Range    Ph Venous 7.31 (L) 7.32 - 7.43 pH    PCO2 Venous 33 (L) 40 - 50 mm Hg    PO2 Venous 44 25 - 47 mm Hg    Bicarbonate Venous 17 (L) 21 - 28 mmol/L    O2 Sat Venous 76 %    Sodium 138 133 - 144 mmol/L    Potassium 4.4 3.4 - 5.3 mmol/L    Glucose 102 (H) 70 - 99 mg/dL    Calcium Ionized 5.1 4.4 - 5.2 mg/dL    Hemoglobin 9.5 (L) 13.3 - 17.7 g/dL    Hematocrit - POCT 28 (L) 40.0 - 53.0 %PCV   ISTAT gases lactate cristino POCT   Result Value Ref Range    Ph Venous 7.30 (L) 7.32 - 7.43 pH    PCO2 Venous 33 (L) 40 - 50 mm Hg    PO2 Venous 52 (H) 25 - 47 mm Hg    Bicarbonate Venous 16 (L) 21 - 28 mmol/L    O2 Sat Venous 83 %    Lactic Acid 1.7 0.7 - 2.1 mmol/L   ABO/Rh type and screen   Result Value Ref Range    ABO B     RH(D)  Pos     Antibody Screen Neg     Test Valid Only At       [facility]    Specimen Expires  01/12/2017    Blood culture   Result Value Ref Range    Specimen Description Blood RHAND     Special Requests Aerobic and anaerobic bottles received     Culture Micro Pending     Micro Report Status Pending    Blood culture   Result Value Ref Range    Specimen Description Blood Right Arm     Special Requests Aerobic and anaerobic bottles received     Culture Micro Pending     Micro Report Status Pending       Patient's MRI revealed no evidence of stroke and in consultation with the stroke team , differential diagnosis of possble seizure with prolonged postictal period was dicussed.     By exam the patient started to stick his tongue out to command and utter one word responses to certain questions.     eventually the patient's wife did call and then she was informed of the patient's status.     The case was discussed with the intensivist and at this time the patient will be admitted to the MICU. Dialysis had been notified and will plan to dial the patient upstairs.      Critical Care time:  was greater than 60 minutes for this patient excluding procedures.    The patient has stroke symptoms:           ED Stroke specific documentation           NIHSS PDF          Protocol PDF     Patient last known well time: prior to being sent to dialysis this morning         Labs Ordered and Resulted from Time of ED Arrival Up to the Time of Departure from the ED   CBC WITH PLATELETS DIFFERENTIAL - Abnormal; Notable for the following:     RBC Count 1.80 (*)     Hemoglobin 6.0 (*)     Hematocrit 18.4 (*)      (*)     MCH 33.3 (*)     RDW 17.8 (*)     Platelet Count 103 (*)     Absolute Lymphocytes 0.7 (*)     All other components within normal limits   PARTIAL THROMBOPLASTIN TIME - Abnormal; Notable for the following:     PTT 64 (*)     All other components within normal limits   INR - Abnormal; Notable for the following:     INR 5.25 (*)     All other components within normal limits   COMPREHENSIVE METABOLIC PANEL -  Abnormal; Notable for the following:     Carbon Dioxide 19 (*)     Glucose 107 (*)     Urea Nitrogen 76 (*)     Creatinine 5.00 (*)     GFR Estimate 12 (*)     GFR Estimate If Black 14 (*)     Albumin 2.7 (*)     All other components within normal limits   INR POINT OF CARE - Abnormal; Notable for the following:     INR Point of Care 5.7 (*)     All other components within normal limits   GLUCOSE BY METER - Abnormal; Notable for the following:     Glucose 103 (*)     All other components within normal limits   ISTAT GASES ELEC ICA GLUC RAFA POCT - Abnormal; Notable for the following:     Ph Venous 7.31 (*)     PCO2 Venous 33 (*)     Bicarbonate Venous 17 (*)     Glucose 102 (*)     Hemoglobin 9.5 (*)     Hematocrit - POCT 28 (*)     All other components within normal limits   ISTAT  GASES LACTATE RAFA POCT - Abnormal; Notable for the following:     Ph Venous 7.30 (*)     PCO2 Venous 33 (*)     PO2 Venous 52 (*)     Bicarbonate Venous 16 (*)     All other components within normal limits   GLUCOSE MONITOR NURSING POCT   AMMONIA   ACTIVITY   PULSE OXIMETRY NURSING   NOTIFY   ISTAT INR NURSING POCT   ISTAT TROPONIN NURSING POCT   ISTAT CG4 GASES LACTATE RAFA NURSING POCT   ASSESSMENT   ABO/RH TYPE AND SCREEN   BLOOD CULTURE   BLOOD CULTURE       Assessments & Plan (with Medical Decision Making) - see above     I have reviewed the nursing notes.    Medications   0.9% sodium chloride infusion ( Intravenous Rate/Dose Change 1/9/17 1013)   iopamidol (ISOVUE-370) solution 75 mL (75 mLs Intravenous Given 1/9/17 0908)   sodium chloride 0.9 % for CT scan flush dose 90 mL (90 mLs Intravenous Given 1/9/17 0908)   pantoprazole (PROTONIX) 40 mg IV push injection (40 mg Intravenous Given 1/9/17 1232)     Patient was typed and screened for his anemia and it was discovered that the patient was recently at [facility] for transfusion as well with an unknown presumed bleeding site      Final diagnoses:   Altered mental status,  unspecified altered mental status type - possibly post-ictal   ESRD (end stage renal disease) on dialysis (H) - in fluid overload   DNR (do not resuscitate) - has advanced directive from care facility   DNI (do not intubate) - has advanced directive from care facility   Anemia, unspecified type      admit to MICU    Bernardo Padilla MD    1/9/2017   [facility], [place], EMERGENCY DEPARTMENT      Bernardo Padilla MD  01/09/17 9584

## 2017-01-09 NOTE — CONSULTS
Nephrology Initial Consult  January 9, 2017      Laurent Chen MRN:[id number] YOB: 1950  Date of Admission:1/9/2017  Primary care provider: Other Robert, Md  Requesting physician: Bernardo Padilla*    ASSESSMENT AND RECOMMENDATIONS:   Laurent Chen is a 66 year old with PMH of COPD, ISAURA, HTN, CAD s/p PCI, CVA, chronic afib (on coumadin),  TAVR (7/2014),  ESRD secondary to FSGS with failed LDKT on in center dialysis, GIB (3/2016), admitted from dialysis with AMS and concern for stroke (ruled out).    ESRD: dialyzes MWF at [facility] (just transferred to this unit from [facility], has only had 4 runs at [place]) under the care of Dr. Mcelroy. Access: LUE AVF. Run time: 4 hrs. EDW: 89.0 kg.   - Will dialyze today  - Daily BMP, phos 3x/week    Metabolic acidosis: with appropriate respiratory compensation  - Will correct with bicarb on dialysis  - Will avoid rapid shift to avoid CO2 retention (and worsening AMS or cerebral edema)    Volume: EDW 89.0 kg. Hx of volume overload and large intra-dialytic weight gains. Sats currently 92% on 4 L. CXR with pulmonary edema.  It appears he may be on 4 L oxygen at nursing home. Appears mildly hypervolemic with 1+ LE edema. Tachypnea with RR 20-30's.  - Gentle UF of 1 kg today (conservative given possible sepsis or GIB)  - Monitor In/Outs  - Daily weights    BP: currently normotensive. Hx of CHF and TAVR, chronic afib (on coumadin). Appears to be on metoprolol XL 25 mg qday. Also appears from [facility] that midodrine 10 mg has been used prn for dialysis.  - Will maintain BP > 105 on dialysis  - Blood pressure meds currently held    Anemia: recent hgb 6.6, today 6.0. Concern for some type of bleed, especially given supra-therapeutic INR and significantly elevated BUN. Recent IS 19. Hx of GIB 3/2016 with no site located ([facility]). Also hx of epogen resistance. CT/MRI without evidence of intracranial bleed.  -   Transfusion, per primary team  - Per chart review, appears patient had been on epogen 20,000 units per HD (at [facility] 1/9/2017), had just switched HD units and had not been restarted. If AMS is not stroke related, will restart epogen in the next few days.  - Will give 50 mg venofer qMonday    BMD: Ca 9.4, alb 2.7, ionized Ca 5.1. No recent PTH, recent phos level ~ 8. Had been on cholecalciferol 1000 units qday and Oscal 1 tab bid  - Will hold Vit D/calcium for now.  - Would check phos eventually    Tachypnea: oxygen sats low to mid 90's on 4 L. Quite anemic, though may not be acute ?. Possible PNA. Blood cultures pending as well. No leukocytosis or fever. Likely some volume overload with pulm edema on CXR.   - 1 L UF today    AMS: CT/MRI without evidence of bleed/stroke. Seizures? May be sepsis (cultures pending, possible PNA). May be an element of metabolic encephalopathy. Blood gases are ok. Med list looks ok as well.  - Will dialyze today  - Would start empiric antibiotics  - Neurology recs pending       Recommendations were communicated to primary team via this note and in person.         Vicky Paniagua PA-C  Division of Kidney Disease  [phone]      REASON FOR CONSULT: ESRD and management of dialysis    HISTORY OF PRESENT ILLNESS:  Laurent Chen is a 66 year old with PMH of COPD, ISAURA, HTN, CAD s/p PCI, CVA, chronic afib (on coumadin),  TAVR (7/2014),  ESRD secondary to FSGS with failed LDKT on in center dialysis, GIB (3/2016), admitted from dialysis with AMS and concern for stroke (ruled out). Patient presented to [facility] with shortness of breath, unresponsive, and eyes fixed in upward gaze. Evidently the patient was starting to be more confused last night at nursing home and was worse this morning. He was not started on dialysis and instead was sent from dialysis unit via EMS to ED where a stroke code was called. Acute bleed/stroke were ruled out as well as dissection. Neurology is  following patient. No known hx of seizures. Apparently at baseline, his mental status is quite normal. Hgb in the ED is 6.0 with BUN elevated in 70's concerning for a bleed. Per chart review, patient was admitted to [facility] in March 2016 with GIB and no source found. He is also resistant to epogen and was being given high dose epogen per each dialysis run. Imaging shows possible PNA with likely pulmonary edema; patient has hx of volume overload. Blood gases are ok and the patient has a metabolic acidosis as one would expect given that he is due for dialysis. Lactic acid is normal and ammonia is pending. Blood pressures are stable and will plan to dialyze today with gentle UF to hopefully improve tachypnea (RR 20-30's). Likely he will receive a transfusion as well as be started on antibiotics.     PAST MEDICAL HISTORY:  Reviewed with patient on 01/09/2017     Past Medical History   Diagnosis Date     Aortic stenosis      SOB (shortness of breath)      COPD (chronic obstructive pulmonary disease) (H)      Pneumonia 1/31/14-2/8/14     Volume overload      Dialysis patient (H)      Sleep apnea      HTN (hypertension)      ASCVD (arteriosclerotic cardiovascular disease)      Anemia      Thrombocytopenia (H)      A-V fistula (H)      Tobacco use      Atrial fibrillation (H)      Gout      CAD (coronary artery disease)      CVA (cerebral vascular accident) (H)      no residual     Stented coronary artery      Antiplatelet or antithrombotic long-term use      Walking troubles      Difficulty in walking(719.7)      Oxygen dependent      4 liters     Heart murmur      Renal failure      Dialysis M-W-F     Gastro-oesophageal reflux disease        Past Surgical History   Procedure Laterality Date     Av fistula or graft arterial       Transplant kidney recipient living related       kidney from daughter     Cataract iol, rt/lt       Cardiac surgery       plasty with stent     Heart cath femoral cannulization with open  standby repair aortic valve  7/23/2014     Procedure: HEART CATH FEMORAL CANNULIZATION WITH OPEN STANDBY REPAIR AORTIC VALVE;  Surgeon: Chu Rao MD;  Location: [facility]        MEDICATIONS:  PTA Meds  Prior to Admission medications    Medication Sig Last Dose Taking? Auth Provider   omeprazole (PRILOSEC OTC) 20 MG tablet Take 1 tablet (20 mg) by mouth daily   Marshall Rice MD   ticagrelor (BRILINTA) 90 MG tablet Take 1 tablet (90 mg) by mouth every 12 hours   Clif Murphy MD   calcium acetate (PHOSLO) 667 MG CAPS Take 667 mg by mouth 3 times daily (with meals) And with snack   Reported, Patient   atorvastatin (LIPITOR) 40 MG tablet Take 1 tablet (40 mg) by mouth daily   Jaleesa Jones PA-C   ALLOPURINOL PO Take 100 mg by mouth daily   Reported, Patient   nitroglycerin (NITROSTAT) 0.4 MG SL tablet Place 1 tablet (0.4 mg) under the tongue every 5 minutes as needed for chest pain   Tu Granado MD   triamcinolone (KENALOG) 0.1 % cream Apply topically as needed   Unknown, Entered By History   ammonium lactate (AMLACTIN) 12 % cream Apply topically as needed   Unknown, Entered By History   WARFARIN SODIUM PO Take 5-7.5 mg by mouth daily . Take 5mg once daily every Thursday, Saturday, and Sunday. Take 7.5mg once daily every Monday, Tuesday, Wednesday, and Friday.   Unknown, Entered By History   ACETAMINOPHEN PO Take 500 mg by mouth at onset of headache As needed   Unknown, Entered By History   albuterol (PROAIR HFA, PROVENTIL HFA, VENTOLIN HFA) 108 (90 BASE) MCG/ACT inhaler Inhale 2 puffs into the lungs every 4 hours as needed   Unknown, Entered By History   VITAMIN D, CHOLECALCIFEROL, PO Take 1,000 Units by mouth daily   Unknown, Entered By History   ipratropium - albuterol 0.5 mg/2.5 mg/3 mL (DUONEB) 0.5-2.5 (3) MG/3ML nebulization Take 1 vial by nebulization every 4 hours as needed   Provider, Historical   metoprolol (LOPRESSOR) 100 MG tablet Take 100 mg by mouth 2 times daily Take 100 mg bid  on non-dialysis days. 100 mg once a day on dialysis days.   Provider, Historical   furosemide (LASIX) 80 MG tablet Take 80 mg by mouth 2 times daily   Provider, Historical   Calcium-Vitamin D (CALTRATE 600 PLUS-VIT D OR) Take by mouth 2 times daily   Provider, Historical      Current Meds     Infusion Meds    NaCl 20 mL/hr at 17 1013       ALLERGIES:    No Known Allergies    REVIEW OF SYSTEMS:  A 10 point review of systems was negative except as noted above.    SOCIAL HISTORY:   Social History     Social History     Marital Status:      Spouse Name: N/A     Number of Children: N/A     Years of Education: N/A     Occupational History     Not on file.     Social History Main Topics     Smoking status: Former Smoker     Types: Cigarettes     Quit date: 2014     Smokeless tobacco: Not on file     Alcohol Use: No     Drug Use: No     Sexual Activity: Not on file     Other Topics Concern     Not on file     Social History Narrative     Reviewed with patient       FAMILY MEDICAL HISTORY:   Family History   Problem Relation Age of Onset     KIDNEY DISEASE No family hx of      Reviewed with patient     PHYSICAL EXAM:   No Data Recorded      No Data Recorded Resp  Av  Min: 20  Max: 34  SpO2  Av.8 %  Min: 83 %  Max: 100 %       BP 99/77 mmHg  Resp 34  SpO2 100%       Admit       GENERAL APPEARANCE: unresponsive to voice, NAD  EYES: no scleral icterus, eyes fixed in upward gaze  HENT: NC/AT, mouth  without ulcers or lesions  Pulmonary: course lung sounds; tachypneic with increased work of breathing  CV: irregular rhythm, normal rate    - Edema: 1+ LE  GI: soft, non-tender   MS: no evidence of inflammation in joints, no muscle tenderness  : no Barragan   SKIN: no rash, warm, dry, no cyanosis  NEURO: moved eyes slightly to voice    LABS:   CMP  Recent Labs  Lab 17  0900 17  0859    138   POTASSIUM 4.4 4.4   CHLORIDE 105  --    CO2 19*  --    ANIONGAP 14  --    * 102*   BUN  76*  --    CR 5.00*  --    GFRESTIMATED 12*  --    GFRESTBLACK 14*  --    MICHELLE 9.4  --    PROTTOTAL 7.0  --    ALBUMIN 2.7*  --    BILITOTAL 0.5  --    ALKPHOS 103  --    AST 8  --    ALT 10  --      CBC  Recent Labs  Lab 01/09/17  0900 01/09/17  0859   HGB 6.0* 9.5*   WBC 7.4  --    RBC 1.80*  --    HCT 18.4*  --    *  --    MCH 33.3*  --    MCHC 32.6  --    RDW 17.8*  --    *  --      INR  Recent Labs  Lab 01/09/17  0900 01/09/17  0839   INR 5.25* 5.7*   PTT 64*  --      ABGNo lab results found in last 7 days.   URINE STUDIES  Recent Labs   Lab Test  07/23/14   0725   COLOR  Yellow   APPEARANCE  Clear   URINEGLC  Negative   URINEBILI  Negative   URINEKETONE  Negative   SG  1.012   UBLD  Trace*   URINEPH  6.0   PROTEIN  100*   NITRITE  Negative   LEUKEST  Negative   RBCU  1   WBCU  <1     No lab results found.  PTH  No lab results found.  IRON STUDIES  No lab results found.    IMAGING:  Reviewed.    Vicky Paniagua PA-C

## 2017-01-09 NOTE — PLAN OF CARE
Problem: Goal Outcome Summary  Goal: Goal Outcome Summary  Outcome: Declining  D;  Upon arriving to the medical ICU at 15:15, pt noted to be agonally breathing using upper chest and neck muscles for breathing.  Pt in atrial fibrillation with heart rate of 110's.    Pt had no gag reflex as well as no response to painful stimuli.    I;  RN paged the medical icu team notifying them of pt's condition.  RN had lab draw blood work.  A;  Pt's hgb=5.8.  Pt had a bedside echo done at the bedside which looked like there might have been a blood clot in the LV of the heart.  Pt's heart rate began dropping eventually becoming bradycardic and hypotensive.  When pt's wife was called 9 she had been called multiples of times with no answer  When able to get into contact with her she stated pt was DNR/DNI.  No further interventions performed on pt.  Pr  at 17:15 of unknown etiology.  Family decided to have an autopsy performed.  Pt was cleaned and then [place] [facility] [place]  Family will call security when they know which mortuary they are going to use.

## 2017-01-09 NOTE — ED NOTES
Pt BIBA from dialysis. EMS states dialysis called EMS due to patient appearing to have an altered level of consciousness. Pt unable to follow commands. Does not answer questions. Eyes deviated to the right. Stroke code called at 0820. Pt currently in CT (per MD request) no IV access has been obtained, vascular access attempted multiple times.

## 2017-01-09 NOTE — DISCHARGE SUMMARY
[facility] Death Summary    Laurent Chen   Age: 66 year old    MRN# [id number]  YOB: 1950        Date of Admission:  17  Date of Death::  17   Admitting Physician:  Ruben Baxter MD  Discharge Physician:  Dr. Baxter    Admission Diagnoses:  DNI (do not intubate) [Z78.9]  DNR (do not resuscitate) [Z66]  ESRD (end stage renal disease) on dialysis (H) [N18.6, Z99.2]  Altered mental status, unspecified altered mental status type [R41.82]  Anemia, unspecified type [D64.9]    Consultations:  Renal   Neurology     Hospital Course:  Laurent Chen is a 66 year old with PMH of COPD, ISAURA, HTN, CAD s/p PCI, CVA, afib (on coumadin),  TAVR (2014),  ESRD secondary to FSGS with failed LDKT on in center dialysis, admitted to [facility] on 2017 with encephalopathy of unknown etiology. Upon arrival to ED he was seen by Stroke team and MRI/CT was negative for CVA. Stroke team recommended general neurology consult for workup for encephalopathy. At this juncture, VSS and patient interactive however still not back to prior to hospital admission. He was admitted to MICU but did not arrive on MICU floor until approx 4:00 pm. Upon arrival to MICU floor patient noted to have absent gag reflex, worsening agonal breathing and diminished interaction with nursing. Patient wife called as concern for impending respiratory failure (reportedly POLST on file was DNR/DNI per [employer]) and wife confirmed patient was DNR/DNI. After this conversation, patient had worsening agonal breathing, bradycardic, was not responsive and had further loss of his neurological function and eventually  from respiratory failure secondary to unknown etiology.     Patient  on 17 at approximately 5:15 pm. Autopsy was granted.    Vy An   Internal Medicine

## 2017-01-15 LAB
BACTERIA SPEC CULT: NO GROWTH
BACTERIA SPEC CULT: NO GROWTH
Lab: NORMAL
Lab: NORMAL
MICRO REPORT STATUS: NORMAL
MICRO REPORT STATUS: NORMAL
SPECIMEN SOURCE: NORMAL
SPECIMEN SOURCE: NORMAL

## 2017-02-13 LAB — COPATH REPORT: NORMAL
